# Patient Record
(demographics unavailable — no encounter records)

---

## 2025-02-10 NOTE — ASSESSMENT
[FreeTextEntry1] : 39 Y OLD FEM S/P INFLUENZA 1/22/25= MILD SPORADIC COUGH = OBSERVE  VARICOSE VEINS = VASC EVAL  RTO FOR CPE 6/25 AND CT CHETS FOR 3 MM OPACITY ON CXR 1/22/25

## 2025-02-10 NOTE — HISTORY OF PRESENT ILLNESS
[de-identified] : WAS DX WITH ACUTE INFLUENZA AT [facility] 1/22/25 ;SINCE THEN SPORADIC DRY COUGH 1-3 TIMES A DAY ;THEN RESOLVES  AT ER CXR SHOWED ? 3 MM OPACITY  WAS AT ER [facility] LAST WEEK FOR BLUNT TRAUMA R FOOT ([employer] COMP)

## 2025-06-11 NOTE — PHYSICAL EXAM
[No Acute Distress] : no acute distress [Normal Sclera/Conjunctiva] : normal sclera/conjunctiva [Normal Outer Ear/Nose] : the outer ears and nose were normal in appearance [No JVD] : no jugular venous distention [Normal] : normal rate, regular rhythm, normal S1 and S2 and no murmur heard [No Edema] : there was no peripheral edema [Soft] : abdomen soft [Non Tender] : non-tender [Normal Bowel Sounds] : normal bowel sounds [Normal Anterior Cervical Nodes] : no anterior cervical lymphadenopathy [No CVA Tenderness] : no CVA  tenderness [No Rash] : no rash [Coordination Grossly Intact] : coordination grossly intact [No Focal Deficits] : no focal deficits [Alert and Oriented x3] : oriented to person, place, and time

## 2025-06-11 NOTE — ASSESSMENT
[FreeTextEntry1] : CPE OF 39 Y OLD FEM = LABS  GYN RECOMM  RTO 1 Y  [Vaccines Reviewed] : Immunizations reviewed today. Please see immunization details in the vaccine log within the immunization flowsheet.